# Patient Record
Sex: MALE | Race: ASIAN | URBAN - METROPOLITAN AREA
[De-identification: names, ages, dates, MRNs, and addresses within clinical notes are randomized per-mention and may not be internally consistent; named-entity substitution may affect disease eponyms.]

---

## 2023-07-13 ENCOUNTER — OFFICE VISIT (OUTPATIENT)
Dept: URBAN - METROPOLITAN AREA CLINIC 22 | Facility: CLINIC | Age: 53
End: 2023-07-13
Payer: MEDICARE

## 2023-07-13 DIAGNOSIS — Z79.84 LONG TERM CURRENT USE OF ORAL HYPOGLYCEMIC DRUG: ICD-10-CM

## 2023-07-13 DIAGNOSIS — E11.9 TYPE 2 DIABETES MELLITUS W/O COMPLICATION: Primary | ICD-10-CM

## 2023-07-13 DIAGNOSIS — H16.223 KERATOCONJUNCT SICCA, NOT SPECIFIED AS SJOGREN'S, BILATERAL: ICD-10-CM

## 2023-07-13 DIAGNOSIS — H40.1114 PRIMARY OPEN-ANGLE GLAUCOMA, RIGHT EYE, INDETERMINATE STAGE: ICD-10-CM

## 2023-07-13 DIAGNOSIS — H52.13 MYOPIA, BILATERAL: ICD-10-CM

## 2023-07-13 PROCEDURE — 92133 CPTRZD OPH DX IMG PST SGM ON: CPT | Performed by: STUDENT IN AN ORGANIZED HEALTH CARE EDUCATION/TRAINING PROGRAM

## 2023-07-13 PROCEDURE — 99204 OFFICE O/P NEW MOD 45 MIN: CPT | Performed by: STUDENT IN AN ORGANIZED HEALTH CARE EDUCATION/TRAINING PROGRAM

## 2023-07-13 RX ORDER — METFORMIN HYDROCHLORIDE 500 MG/1
500 MG TABLET, FILM COATED ORAL
Qty: 0 | Refills: 0 | Status: ACTIVE
Start: 2023-07-13

## 2023-07-13 RX ORDER — LATANOPROST 50 UG/ML
0.005 % SOLUTION OPHTHALMIC
Qty: 10 | Refills: 3 | Status: ACTIVE
Start: 2023-07-13

## 2023-07-13 ASSESSMENT — INTRAOCULAR PRESSURE
OS: 18
OD: 14

## 2023-07-13 ASSESSMENT — VISUAL ACUITY
OS: 20/25
OD: 20/25

## 2023-07-13 NOTE — IMPRESSION/PLAN
Impression: Primary open-angle glaucoma, right eye, indeterminate stage: H40.1114.
--IOP today 14/18 (on tx, unknown Tmax) --tx: latanoprost QHS OD
--OCT 07/13/23: OD 83; thin I, bdl T 
                           OS 94; bdl T
--no known family hx Plan: Discussed findings, condition with patient. Has been on latanoprost QHS OD only. OCT RNFL ordered: thinning OD, borderline OS. Continue: latanoprost QHS OD. RTC 3-4 months for HVF 24-2, pachymetry, and IOP check.

## 2023-07-13 NOTE — IMPRESSION/PLAN
Impression: Keratoconjunct sicca, not specified as Sjogren's, bilateral: V89.796. Plan: Discussed findings and patient educated on condition. Rx artificial tears QID OU, warm compresses, and lid scrubs.

## 2023-07-13 NOTE — IMPRESSION/PLAN
Impression: Type 2 diabetes mellitus w/o complication: Q18.5. Plan: Discussed findings, no retinopathy or macular edema OU. Patient educated on importance of well-controlled blood sugar/pressure, risk of vision loss from diabetic retinopathy, and dilated eye exams. Continue management with PCP.